# Patient Record
Sex: FEMALE | Race: WHITE | Employment: FULL TIME | URBAN - METROPOLITAN AREA
[De-identification: names, ages, dates, MRNs, and addresses within clinical notes are randomized per-mention and may not be internally consistent; named-entity substitution may affect disease eponyms.]

---

## 2017-03-16 ENCOUNTER — HOSPITAL ENCOUNTER (OUTPATIENT)
Age: 37
Discharge: HOME | End: 2017-03-16
Payer: COMMERCIAL

## 2017-03-16 LAB
THYROXINE, FREE: 1.18 NG/DL (ref 0.93–1.7)
TSH SERPL DL<=0.05 MIU/L-ACNC: 3.13 MIU/L (ref 0.3–5)

## 2017-03-16 PROCEDURE — 36415 COLL VENOUS BLD VENIPUNCTURE: CPT

## 2017-03-16 PROCEDURE — 84443 ASSAY THYROID STIM HORMONE: CPT

## 2017-03-16 PROCEDURE — 84439 ASSAY OF FREE THYROXINE: CPT

## 2017-10-16 ENCOUNTER — HOSPITAL ENCOUNTER (OUTPATIENT)
Age: 37
Discharge: HOME | End: 2017-10-16
Payer: COMMERCIAL

## 2017-10-16 LAB
THYROXINE, FREE: 1.26 NG/DL (ref 0.93–1.7)
TSH SERPL DL<=0.05 MIU/L-ACNC: 2.23 MIU/L (ref 0.3–5)

## 2017-10-16 PROCEDURE — 84443 ASSAY THYROID STIM HORMONE: CPT

## 2017-10-16 PROCEDURE — 36415 COLL VENOUS BLD VENIPUNCTURE: CPT

## 2017-10-16 PROCEDURE — 84439 ASSAY OF FREE THYROXINE: CPT

## 2018-07-30 ENCOUNTER — HOSPITAL ENCOUNTER (OUTPATIENT)
Age: 38
Discharge: HOME | End: 2018-07-30
Payer: COMMERCIAL

## 2018-07-30 LAB
THYROXINE, FREE: 1.27 NG/DL (ref 0.93–1.7)
TSH SERPL DL<=0.05 MIU/L-ACNC: 2.95 MIU/L (ref 0.3–5)

## 2018-07-30 PROCEDURE — 84439 ASSAY OF FREE THYROXINE: CPT

## 2018-07-30 PROCEDURE — 36415 COLL VENOUS BLD VENIPUNCTURE: CPT

## 2018-07-30 PROCEDURE — 84443 ASSAY THYROID STIM HORMONE: CPT

## 2018-09-18 ENCOUNTER — HOSPITAL ENCOUNTER (OUTPATIENT)
Age: 38
Discharge: HOME | End: 2018-09-18
Payer: COMMERCIAL

## 2018-09-18 ENCOUNTER — OFFICE VISIT (OUTPATIENT)
Dept: FAMILY MEDICINE CLINIC | Age: 38
End: 2018-09-18
Payer: COMMERCIAL

## 2018-09-18 VITALS
HEIGHT: 67 IN | DIASTOLIC BLOOD PRESSURE: 70 MMHG | SYSTOLIC BLOOD PRESSURE: 100 MMHG | BODY MASS INDEX: 21.41 KG/M2 | RESPIRATION RATE: 14 BRPM | WEIGHT: 136.4 LBS | TEMPERATURE: 97.9 F | HEART RATE: 56 BPM

## 2018-09-18 DIAGNOSIS — R53.83 FATIGUE, UNSPECIFIED TYPE: ICD-10-CM

## 2018-09-18 DIAGNOSIS — E03.9 HYPOTHYROIDISM, UNSPECIFIED TYPE: ICD-10-CM

## 2018-09-18 DIAGNOSIS — R00.1 BRADYCARDIA: ICD-10-CM

## 2018-09-18 DIAGNOSIS — R00.2 PALPITATION: Primary | ICD-10-CM

## 2018-09-18 DIAGNOSIS — R06.02 SOB (SHORTNESS OF BREATH): ICD-10-CM

## 2018-09-18 DIAGNOSIS — R00.2 PALPITATIONS: Primary | ICD-10-CM

## 2018-09-18 LAB
EKG ATRIAL RATE: 54 BPM
EKG P AXIS: 59 DEGREES
EKG P-R INTERVAL: 130 MS
EKG Q-T INTERVAL: 456 MS
EKG QRS DURATION: 82 MS
EKG QTC CALCULATION (BAZETT): 432 MS
EKG R AXIS: 61 DEGREES
EKG T AXIS: 25 DEGREES
EKG VENTRICULAR RATE: 54 BPM

## 2018-09-18 PROCEDURE — 93005 ELECTROCARDIOGRAM TRACING: CPT

## 2018-09-18 PROCEDURE — 99204 OFFICE O/P NEW MOD 45 MIN: CPT | Performed by: NURSE PRACTITIONER

## 2018-09-18 PROCEDURE — G8427 DOCREV CUR MEDS BY ELIG CLIN: HCPCS | Performed by: NURSE PRACTITIONER

## 2018-09-18 PROCEDURE — G8420 CALC BMI NORM PARAMETERS: HCPCS | Performed by: NURSE PRACTITIONER

## 2018-09-18 PROCEDURE — 1036F TOBACCO NON-USER: CPT | Performed by: NURSE PRACTITIONER

## 2018-09-18 RX ORDER — M-VIT,TX,IRON,MINS/CALC/FOLIC 27MG-0.4MG
1 TABLET ORAL DAILY
COMMUNITY

## 2018-09-18 RX ORDER — CHLORAL HYDRATE 500 MG
CAPSULE ORAL
COMMUNITY

## 2018-09-18 RX ORDER — LEVOTHYROXINE SODIUM 112 UG/1
TABLET ORAL
Refills: 1 | COMMUNITY
Start: 2018-08-19

## 2018-09-18 ASSESSMENT — ENCOUNTER SYMPTOMS
WHEEZING: 0
BACK PAIN: 0
ABDOMINAL PAIN: 0
NAUSEA: 0
SHORTNESS OF BREATH: 1

## 2018-09-18 ASSESSMENT — PATIENT HEALTH QUESTIONNAIRE - PHQ9
SUM OF ALL RESPONSES TO PHQ9 QUESTIONS 1 & 2: 0
SUM OF ALL RESPONSES TO PHQ QUESTIONS 1-9: 0
1. LITTLE INTEREST OR PLEASURE IN DOING THINGS: 0
2. FEELING DOWN, DEPRESSED OR HOPELESS: 0
SUM OF ALL RESPONSES TO PHQ QUESTIONS 1-9: 0

## 2018-09-18 NOTE — PROGRESS NOTES
Subjective:      Patient ID: Tucker Hardy is a 43 y.o. female. Visit Information    Have you changed or started any medications since your last visit including any over-the-counter medicines, vitamins, or herbal medicines? no   Are you having any side effects from any of your medications? -  no  Have you stopped taking any of your medications? Is so, why? -  no    Have you seen any other physician or provider since your last visit? Yes - Records Obtained  Have you had any other diagnostic tests since your last visit? Yes - Records Obtained  Have you been seen in the emergency room and/or had an admission to a hospital since we last saw you? No  Have you had your routine dental cleaning in the past 6 months? yes -     Have you activated your [employer] account? If not, what are your barriers? Yes     Patient Care Team:  Kelvin Santacruz MD as PCP - General (Family Medicine)  Evelyn Venegas MD as Consulting Physician (Endocrinology)  Shonda Woo MD    Medical History Review  Past Medical, Family, and Social History reviewed and does not contribute to the patient presenting condition    Health Maintenance   Topic Date Due    HIV screen  01/13/1994    DTaP/Tdap/Td vaccine (5 - Tdap) 07/23/1997    Cervical cancer screen  03/20/2018    Flu vaccine (1) 09/01/2018    TSH testing  07/30/2019     HPI\  43year old female presents with palpitations tachycardia for over a months and she noticed it more often. States her pulses drop to 40s when she has palpitations. She also has fatigue and mild sob at times. She says she can feel her heart beat in her throat. Denies dizziness lightheadedness chest pain diaphoresis headache or nv abd pain. Currently she is not on bp therapy and it is stable today. Hx of hypothyroidism managed by Dr. Mitch Reece. Her recent thyroid level was unremarkable. She has hx of mitral valve prolapse. Pt is non smoker alcohol or illicit drug use. fhx of heart disease.     Review of Systems

## 2018-09-19 ENCOUNTER — HOSPITAL ENCOUNTER (OUTPATIENT)
Age: 38
Discharge: HOME | End: 2018-09-19
Payer: COMMERCIAL

## 2018-09-19 DIAGNOSIS — R00.1 BRADYCARDIA: ICD-10-CM

## 2018-09-19 DIAGNOSIS — R00.2 PALPITATION: ICD-10-CM

## 2018-09-19 DIAGNOSIS — R53.83 FATIGUE, UNSPECIFIED TYPE: ICD-10-CM

## 2018-09-19 LAB
ANION GAP SERPL CALCULATED.3IONS-SCNC: 10 MMOL/L (ref 9–17)
BUN BLDV-MCNC: 12 MG/DL (ref 6–20)
BUN/CREAT BLD: NORMAL (ref 9–20)
CALCIUM SERPL-MCNC: 8.9 MG/DL (ref 8.6–10.4)
CHLORIDE BLD-SCNC: 104 MMOL/L (ref 98–107)
CHOLESTEROL/HDL RATIO: 1.8
CHOLESTEROL: 142 MG/DL
CO2: 27 MMOL/L (ref 20–31)
CREAT SERPL-MCNC: 0.86 MG/DL (ref 0.5–0.9)
GFR AFRICAN AMERICAN: >60 ML/MIN
GFR NON-AFRICAN AMERICAN: >60 ML/MIN
GFR SERPL CREATININE-BSD FRML MDRD: NORMAL ML/MIN/{1.73_M2}
GFR SERPL CREATININE-BSD FRML MDRD: NORMAL ML/MIN/{1.73_M2}
GLUCOSE BLD-MCNC: 95 MG/DL (ref 70–99)
HCT VFR BLD CALC: 38.6 % (ref 36–46)
HDLC SERPL-MCNC: 81 MG/DL
HEMOGLOBIN: 12.6 G/DL (ref 12–16)
LDL CHOLESTEROL: 50 MG/DL (ref 0–130)
MCH RBC QN AUTO: 29.1 PG (ref 26–34)
MCHC RBC AUTO-ENTMCNC: 32.8 G/DL (ref 31–37)
MCV RBC AUTO: 88.8 FL (ref 80–100)
NRBC AUTOMATED: NORMAL PER 100 WBC
PDW BLD-RTO: 13.3 % (ref 11.5–14.9)
PLATELET # BLD: 235 K/UL (ref 150–450)
PMV BLD AUTO: 9 FL (ref 6–12)
POTASSIUM SERPL-SCNC: 4.7 MMOL/L (ref 3.7–5.3)
RBC # BLD: 4.34 M/UL (ref 4–5.2)
SODIUM BLD-SCNC: 141 MMOL/L (ref 135–144)
TRIGL SERPL-MCNC: 55 MG/DL
TSH SERPL DL<=0.05 MIU/L-ACNC: 2.7 MIU/L (ref 0.3–5)
VLDLC SERPL CALC-MCNC: NORMAL MG/DL (ref 1–30)
WBC # BLD: 5.2 K/UL (ref 3.5–11)

## 2018-09-19 PROCEDURE — 80048 BASIC METABOLIC PNL TOTAL CA: CPT

## 2018-09-19 PROCEDURE — 80061 LIPID PANEL: CPT

## 2018-09-19 PROCEDURE — 84443 ASSAY THYROID STIM HORMONE: CPT

## 2018-09-19 PROCEDURE — 36415 COLL VENOUS BLD VENIPUNCTURE: CPT

## 2018-09-19 PROCEDURE — 85027 COMPLETE CBC AUTOMATED: CPT

## 2018-10-09 ENCOUNTER — HOSPITAL ENCOUNTER (OUTPATIENT)
Age: 38
Discharge: HOME | End: 2018-10-09
Payer: COMMERCIAL

## 2018-10-09 LAB
THYROXINE, FREE: 1.3 NG/DL (ref 0.93–1.7)
TSH SERPL DL<=0.05 MIU/L-ACNC: 3.01 MIU/L (ref 0.3–5)

## 2018-10-09 PROCEDURE — 84439 ASSAY OF FREE THYROXINE: CPT

## 2018-10-09 PROCEDURE — 84443 ASSAY THYROID STIM HORMONE: CPT

## 2018-10-09 PROCEDURE — 36415 COLL VENOUS BLD VENIPUNCTURE: CPT

## 2018-11-20 ENCOUNTER — HOSPITAL ENCOUNTER (OUTPATIENT)
Age: 38
Discharge: HOME | End: 2018-11-20
Payer: COMMERCIAL

## 2018-11-20 LAB
CALCIUM SERPL-MCNC: 8.7 MG/DL (ref 8.6–10.4)
MAGNESIUM: 2 MG/DL (ref 1.6–2.6)
POTASSIUM SERPL-SCNC: 4.7 MMOL/L (ref 3.7–5.3)

## 2018-11-20 PROCEDURE — 83735 ASSAY OF MAGNESIUM: CPT

## 2018-11-20 PROCEDURE — 36415 COLL VENOUS BLD VENIPUNCTURE: CPT

## 2018-11-20 PROCEDURE — 84132 ASSAY OF SERUM POTASSIUM: CPT

## 2018-11-20 PROCEDURE — 82310 ASSAY OF CALCIUM: CPT

## 2018-11-20 PROCEDURE — 83835 ASSAY OF METANEPHRINES: CPT

## 2018-11-21 ENCOUNTER — OFFICE VISIT (OUTPATIENT)
Dept: FAMILY MEDICINE CLINIC | Age: 38
End: 2018-11-21
Payer: COMMERCIAL

## 2018-11-21 VITALS
RESPIRATION RATE: 16 BRPM | HEART RATE: 58 BPM | WEIGHT: 136 LBS | SYSTOLIC BLOOD PRESSURE: 100 MMHG | DIASTOLIC BLOOD PRESSURE: 60 MMHG | BODY MASS INDEX: 21.3 KG/M2 | TEMPERATURE: 97.1 F

## 2018-11-21 DIAGNOSIS — E03.9 HYPOTHYROIDISM, UNSPECIFIED TYPE: Primary | ICD-10-CM

## 2018-11-21 DIAGNOSIS — R00.2 PALPITATION: ICD-10-CM

## 2018-11-21 DIAGNOSIS — R00.1 BRADYCARDIA: ICD-10-CM

## 2018-11-21 PROCEDURE — G8420 CALC BMI NORM PARAMETERS: HCPCS | Performed by: NURSE PRACTITIONER

## 2018-11-21 PROCEDURE — G8427 DOCREV CUR MEDS BY ELIG CLIN: HCPCS | Performed by: NURSE PRACTITIONER

## 2018-11-21 PROCEDURE — 99214 OFFICE O/P EST MOD 30 MIN: CPT | Performed by: NURSE PRACTITIONER

## 2018-11-21 PROCEDURE — 1036F TOBACCO NON-USER: CPT | Performed by: NURSE PRACTITIONER

## 2018-11-21 PROCEDURE — G8484 FLU IMMUNIZE NO ADMIN: HCPCS | Performed by: NURSE PRACTITIONER

## 2018-11-21 ASSESSMENT — ENCOUNTER SYMPTOMS
ABDOMINAL PAIN: 0
WHEEZING: 0
NAUSEA: 0
SHORTNESS OF BREATH: 0

## 2018-11-21 NOTE — PROGRESS NOTES
Subjective:      Patient ID: George Hope is a 43 y.o. female. Visit Information    Have you changed or started any medications since your last visit including any over-the-counter medicines, vitamins, or herbal medicines? no   Are you having any side effects from any of your medications? -  no  Have you stopped taking any of your medications? Is so, why? -  no    Have you seen any other physician or provider since your last visit? Yes - Records Obtained  Have you had any other diagnostic tests since your last visit? Yes - Records Obtained  Have you been seen in the emergency room and/or had an admission to a hospital since we last saw you? No  Have you had your routine dental cleaning in the past 6 months? yes -     Have you activated your [employer] account? If not, what are your barriers? Yes     Patient Care Team:  Justyn Mcdowell MD as PCP - General (Family Medicine)  Bindu Meyer MD as Consulting Physician (Endocrinology)  Colton Huston MD    Medical History Review  Past Medical, Family, and Social History reviewed and does not contribute to the patient presenting condition    Health Maintenance   Topic Date Due    HIV screen  01/13/1994    DTaP/Tdap/Td vaccine (5 - Tdap) 07/23/1997    Cervical cancer screen  03/20/2018    Flu vaccine (1) 09/01/2018    TSH testing  10/09/2019       HPI     43year old female presents with management of hypothyroidism palpitations bradycardia. Currently is on levothyroxine and recent blood tests showed normal TSH and T4. She also follows up with cardiology and cardiac workup was unremarkable. States she is doing better and had one episode of palpitation over the past month. States she runs 20 miles per week and denies dizziness sob chest pain. Pt is non smoker alcohol or illicit drug use. Review of Systems   Constitutional: Negative for chills and fever. Eyes: Negative for visual disturbance. Respiratory: Negative for shortness of breath and wheezing. Cardiovascular: Negative for chest pain and leg swelling. Gastrointestinal: Negative for abdominal pain and nausea. Neurological: Negative for dizziness, weakness and numbness. Psychiatric/Behavioral: Negative for dysphoric mood. Objective:   Physical Exam   Constitutional: She appears well-nourished. No distress. HENT:   Nose: Nose normal.   Mouth/Throat: Oropharynx is clear and moist.   Eyes: Conjunctivae are normal.   Neck: Neck supple. Cardiovascular: Normal rate, regular rhythm and normal heart sounds. Pulmonary/Chest: Effort normal and breath sounds normal. No respiratory distress. Abdominal: Soft. There is no tenderness. Musculoskeletal: Normal range of motion. Lymphadenopathy:     She has no cervical adenopathy. Neurological: She is alert. No cranial nerve deficit. Skin: Skin is warm and dry. Psychiatric: She has a normal mood and affect. Her behavior is normal.   Nursing note and vitals reviewed. Assessment:      1. Hypothyroidism, unspecified type    2. Bradycardia    3. Palpitation            Plan:      BP Readings from Last 3 Encounters:   11/21/18 100/60   09/18/18 100/70   09/08/14 98/60     /60 (Site: Left Upper Arm, Position: Sitting, Cuff Size: Medium Adult)   Pulse 58   Temp 97.1 °F (36.2 °C) (Oral)   Resp 16   Wt 136 lb (61.7 kg)   BMI 21.30 kg/m²   Lab Results   Component Value Date    WBC 5.2 09/19/2018    HGB 12.6 09/19/2018    HCT 38.6 09/19/2018     09/19/2018    CHOL 142 09/19/2018    TRIG 55 09/19/2018    HDL 81 09/19/2018     09/19/2018    K 4.7 11/20/2018     09/19/2018    CREATININE 0.86 09/19/2018    BUN 12 09/19/2018    CO2 27 09/19/2018    TSH 3.01 10/09/2018     Lab Results   Component Value Date    CALCIUM 8.7 11/20/2018     Lab Results   Component Value Date    LDLCHOLESTEROL 50 09/19/2018       1. Hypothyroidism, unspecified type  -stable. Cont current dose. 2. Bradycardia/ 3. Palpitation  - stable.  Cont to monitor.   - follow up with cardiology as scheduled. Requested Prescriptions      No prescriptions requested or ordered in this encounter       There are no discontinued medications. Munira received counseling on the following healthy behaviors: nutrition and exercise  Reviewed prior labs and health maintenance. Continue current medications, diet and exercise. Discussed use, benefit, and side effects of prescribed medications. Barriers to medication compliance addressed. Patient given educational materials - see patient instructions. All patient questions answered. Patient voiced understanding.

## 2018-11-25 LAB
METANEPH/PLASMA INTERP: NORMAL
METANEPHRINE: 0.21 NMOL/L (ref 0–0.49)
NORMETANEPHRINE PLASMA: 0.4 NMOL/L (ref 0–0.89)

## 2019-04-19 ENCOUNTER — HOSPITAL ENCOUNTER (OUTPATIENT)
Age: 39
Discharge: HOME | End: 2019-04-19
Payer: COMMERCIAL

## 2019-04-19 LAB
THYROXINE, FREE: 1.33 NG/DL (ref 0.93–1.7)
TSH SERPL DL<=0.05 MIU/L-ACNC: 1.85 MIU/L (ref 0.3–5)

## 2019-04-19 PROCEDURE — 84439 ASSAY OF FREE THYROXINE: CPT

## 2019-04-19 PROCEDURE — 84443 ASSAY THYROID STIM HORMONE: CPT

## 2019-04-19 PROCEDURE — 36415 COLL VENOUS BLD VENIPUNCTURE: CPT

## 2019-05-29 ENCOUNTER — OFFICE VISIT (OUTPATIENT)
Dept: FAMILY MEDICINE CLINIC | Age: 39
End: 2019-05-29
Payer: COMMERCIAL

## 2019-05-29 VITALS
TEMPERATURE: 97.8 F | WEIGHT: 137 LBS | HEART RATE: 60 BPM | BODY MASS INDEX: 21.46 KG/M2 | SYSTOLIC BLOOD PRESSURE: 100 MMHG | DIASTOLIC BLOOD PRESSURE: 68 MMHG | RESPIRATION RATE: 16 BRPM

## 2019-05-29 DIAGNOSIS — E03.9 HYPOTHYROIDISM, UNSPECIFIED TYPE: Primary | ICD-10-CM

## 2019-05-29 DIAGNOSIS — R00.1 BRADYCARDIA: ICD-10-CM

## 2019-05-29 DIAGNOSIS — R00.2 PALPITATIONS: ICD-10-CM

## 2019-05-29 PROCEDURE — 99214 OFFICE O/P EST MOD 30 MIN: CPT | Performed by: NURSE PRACTITIONER

## 2019-05-29 PROCEDURE — G8420 CALC BMI NORM PARAMETERS: HCPCS | Performed by: NURSE PRACTITIONER

## 2019-05-29 PROCEDURE — G8427 DOCREV CUR MEDS BY ELIG CLIN: HCPCS | Performed by: NURSE PRACTITIONER

## 2019-05-29 PROCEDURE — 1036F TOBACCO NON-USER: CPT | Performed by: NURSE PRACTITIONER

## 2019-05-29 ASSESSMENT — PATIENT HEALTH QUESTIONNAIRE - PHQ9
2. FEELING DOWN, DEPRESSED OR HOPELESS: 0
1. LITTLE INTEREST OR PLEASURE IN DOING THINGS: 0
SUM OF ALL RESPONSES TO PHQ9 QUESTIONS 1 & 2: 0
SUM OF ALL RESPONSES TO PHQ QUESTIONS 1-9: 0
SUM OF ALL RESPONSES TO PHQ QUESTIONS 1-9: 0

## 2019-05-29 ASSESSMENT — ENCOUNTER SYMPTOMS
WHEEZING: 0
NAUSEA: 0
VOMITING: 0
ABDOMINAL PAIN: 0
SHORTNESS OF BREATH: 0

## 2019-05-29 NOTE — PROGRESS NOTES
Subjective:      Patient ID: Corinn Severs is a 35 y.o. female. Chronic Disease Visit Information    BP Readings from Last 3 Encounters:   05/29/19 100/68   11/21/18 100/60   09/18/18 100/70          LDL Cholesterol (mg/dL)   Date Value   09/19/2018 50     HDL (mg/dL)   Date Value   09/19/2018 81     BUN (mg/dL)   Date Value   09/19/2018 12     CREATININE (mg/dL)   Date Value   09/19/2018 0.86     Glucose (mg/dL)   Date Value   09/19/2018 95            Have you changed or started any medications since your last visit including any over-the-counter medicines, vitamins, or herbal medicines? no   Are you having any side effects from any of your medications? -  no  Have you stopped taking any of your medications? Is so, why? -  no    Have you seen any other physician or provider since your last visit? Yes - Records Obtained  Have you had any other diagnostic tests since your last visit? Yes - Records Obtained  Have you been seen in the emergency room and/or had an admission to a hospital since we last saw you? No  Have you had your annual diabetic retinal (eye) exam? No  Have you had your routine dental cleaning in the past 6 months? yes -     Have you activated your [employer] account? If not, what are your barriers? Yes     Patient Care Team:  Julia Meyers MD as PCP - General (Family Medicine)  Angelic Wylie MD as Consulting Physician (Endocrinology)  Netta Rojas MD         Medical History Review  Past Medical, Family, and Social History reviewed and does contribute to the patient presenting condition    Health Maintenance   Topic Date Due    HIV screen  01/13/1994    DTaP/Tdap/Td vaccine (6 - Tdap) 07/22/2007    Flu vaccine (Season Ended) 09/01/2019    Cervical cancer screen  03/20/2020    TSH testing  04/19/2020    Lipid screen  09/19/2023    Pneumococcal 0-64 years Vaccine  Aged Out     HPI  35year old female presents with management of hypothyroidism, palpitations and bradycardia. Currently is on levothyroxine and recent blood tests showed normal TSH and FT4. Denies any appetite or weight changes. Has been doing well over the past 6 months. No recurrent palpitations or low heart rate. HR is 60s today. She also follows up with cardiologist. States she runs 3 miles every day and tolerates running, denies sob chest discomfort dizziness or nv abd pain. Pt is non smoker. Review of Systems   Constitutional: Negative for chills, fatigue, fever and unexpected weight change. Respiratory: Negative for shortness of breath and wheezing. Cardiovascular: Negative for chest pain, palpitations and leg swelling. Gastrointestinal: Negative for abdominal pain, nausea and vomiting. Neurological: Negative for dizziness, weakness and numbness. Psychiatric/Behavioral: Negative for agitation, behavioral problems and dysphoric mood. Objective:   Physical Exam   Constitutional: She appears well-nourished. No distress. HENT:   Nose: Nose normal.   Mouth/Throat: Oropharynx is clear and moist.   Eyes: Conjunctivae are normal.   Neck: Neck supple. Cardiovascular: Normal rate, regular rhythm and normal heart sounds. Pulmonary/Chest: Effort normal and breath sounds normal. No respiratory distress. Abdominal: Soft. There is no tenderness. Musculoskeletal: Normal range of motion. Lymphadenopathy:     She has no cervical adenopathy. Neurological: She is alert. No cranial nerve deficit. Skin: Skin is warm and dry. Psychiatric: She has a normal mood and affect. Her behavior is normal.   Nursing note and vitals reviewed. Assessment:      1. Hypothyroidism, unspecified type    2. Bradycardia    3.  Palpitations            Plan:       BP Readings from Last 3 Encounters:   05/29/19 100/68   11/21/18 100/60   09/18/18 100/70     /68 (Site: Left Upper Arm, Position: Sitting, Cuff Size: Medium Adult)   Pulse 60   Temp 97.8 °F (36.6 °C) (Oral)   Resp 16   Wt 137 lb (62.1 kg)   BMI 21.46 kg/m²   Lab Results   Component Value Date    WBC 5.2 09/19/2018    HGB 12.6 09/19/2018    HCT 38.6 09/19/2018     09/19/2018    CHOL 142 09/19/2018    TRIG 55 09/19/2018    HDL 81 09/19/2018     09/19/2018    K 4.7 11/20/2018     09/19/2018    CREATININE 0.86 09/19/2018    BUN 12 09/19/2018    CO2 27 09/19/2018    TSH 1.85 04/19/2019     Lab Results   Component Value Date    CALCIUM 8.7 11/20/2018     Lab Results   Component Value Date    LDLCHOLESTEROL 50 09/19/2018         1. Hypothyroidism, unspecified type  - stable. Cont current dose     2. Bradycardia/ 3. Palpitations  - stable. Cont to monitor   - follow up with cardiologist as scheduled      Requested Prescriptions      No prescriptions requested or ordered in this encounter       There are no discontinued medications. Munira received counseling on the following healthy behaviors: nutrition and exercise  Reviewed prior labs and health maintenance  Continue current medications, diet and exercise. Discussed use, benefit, and side effects of prescribed medications. Barriers to medication compliance addressed. Patient given educational materials - see patient instructions  Was a self-tracking handout given in paper form or via [employer]? Yes    Requested Prescriptions      No prescriptions requested or ordered in this encounter       All patient questions answered. Patient voiced understanding. Quality Measures    Body mass index is 21.46 kg/m². Normal. Weight control planned discussed medically supervised diet with primary care physician and Healthy diet and regular exercise. BP: 100/68 Blood pressure is normal. Treatment plan consists of Weight Reduction, Dietary Sodium Restriction, Increased Physical Activity and No treatment change needed.     Lab Results   Component Value Date    LDLCHOLESTEROL 50 09/19/2018    (goal LDL reduction with dx if diabetes is 50% LDL reduction)      PHQ Scores 5/29/2019 9/18/2018   PHQ2

## 2019-09-30 ENCOUNTER — OFFICE VISIT (OUTPATIENT)
Dept: FAMILY MEDICINE CLINIC | Age: 39
End: 2019-09-30
Payer: COMMERCIAL

## 2019-09-30 VITALS
RESPIRATION RATE: 16 BRPM | TEMPERATURE: 97.2 F | BODY MASS INDEX: 21.77 KG/M2 | DIASTOLIC BLOOD PRESSURE: 62 MMHG | HEART RATE: 64 BPM | SYSTOLIC BLOOD PRESSURE: 100 MMHG | WEIGHT: 139 LBS

## 2019-09-30 DIAGNOSIS — R00.2 PALPITATIONS: Primary | ICD-10-CM

## 2019-09-30 DIAGNOSIS — E03.9 HYPOTHYROIDISM, UNSPECIFIED TYPE: ICD-10-CM

## 2019-09-30 PROCEDURE — 99214 OFFICE O/P EST MOD 30 MIN: CPT | Performed by: NURSE PRACTITIONER

## 2019-09-30 ASSESSMENT — ENCOUNTER SYMPTOMS
NAUSEA: 0
VOMITING: 0
ABDOMINAL PAIN: 0
WHEEZING: 0
SHORTNESS OF BREATH: 0

## 2019-09-30 NOTE — PROGRESS NOTES
Subjective:      Patient ID: Judson Fairbanks is a 35 y.o. female. Visit Information    Have you changed or started any medications since your last visit including any over-the-counter medicines, vitamins, or herbal medicines? no   Are you having any side effects from any of your medications? -  no  Have you stopped taking any of your medications? Is so, why? -  no    Have you seen any other physician or provider since your last visit? No  Have you had any other diagnostic tests since your last visit? No  Have you been seen in the emergency room and/or had an admission to a hospital since we last saw you? No  Have you had your routine dental cleaning in the past 6 months? yes -     Have you activated your [employer] account? If not, what are your barriers? Yes     Patient Care Team:  Chente Ortega MD as PCP - General (Family Medicine)  Laura Morejon MD as Consulting Physician (Endocrinology)  Rico Craig MD    Medical History Review  Past Medical, Family, and Social History reviewed and does not contribute to the patient presenting condition    Health Maintenance   Topic Date Due    HIV screen  01/13/1994    DTaP/Tdap/Td vaccine (6 - Tdap) 07/22/2007    Flu vaccine (1) 09/01/2019    Cervical cancer screen  03/20/2020    TSH testing  04/19/2020    Lipid screen  09/19/2023    Pneumococcal 0-64 years Vaccine  Aged Out     HPI     35year old female presents with management of palpitations and hypothyroidism. States she still has intermittent palpitations over the past 2 months but for a brief time. The symptoms happens randomly. States she exercises daily but denies sob cp dizziness. Had holter monitor by Dr. Cal Rasheed which was unremarkable. States she has significant fhx of heart disease. her brother was diagnosed with a fib recent. Currently is on levothyroxine and recent blood tests showed normal TSH and FT4. Pt is non smoker. Review of Systems   Constitutional: Negative for chills and fever. Respiratory: Negative for shortness of breath and wheezing. Cardiovascular: Positive for palpitations. Negative for chest pain and leg swelling. Gastrointestinal: Negative for abdominal pain, nausea and vomiting. Neurological: Negative for dizziness, weakness and numbness. Psychiatric/Behavioral: Negative for agitation and behavioral problems. Objective:   Physical Exam   Constitutional: She appears well-nourished. No distress. HENT:   Nose: Nose normal.   Mouth/Throat: Oropharynx is clear and moist.   Eyes: Conjunctivae are normal.   Neck: Neck supple. Cardiovascular: Normal rate, regular rhythm and normal heart sounds. Pulmonary/Chest: Effort normal and breath sounds normal. No respiratory distress. Abdominal: Soft. There is no tenderness. Musculoskeletal: Normal range of motion. Lymphadenopathy:     She has no cervical adenopathy. Neurological: She is alert. No cranial nerve deficit. Skin: Skin is warm and dry. No rash noted. Psychiatric: She has a normal mood and affect. Her behavior is normal.   Nursing note and vitals reviewed. Assessment:      1. Palpitations    2. Hypothyroidism, unspecified type            Plan:      BP Readings from Last 3 Encounters:   09/30/19 100/62   05/29/19 100/68   11/21/18 100/60     /62 (Site: Right Upper Arm, Position: Sitting, Cuff Size: Medium Adult)   Pulse 64   Temp 97.2 °F (36.2 °C) (Oral)   Resp 16   Wt 139 lb (63 kg)   BMI 21.77 kg/m²   Lab Results   Component Value Date    WBC 5.2 09/19/2018    HGB 12.6 09/19/2018    HCT 38.6 09/19/2018     09/19/2018    CHOL 142 09/19/2018    TRIG 55 09/19/2018    HDL 81 09/19/2018     09/19/2018    K 4.7 11/20/2018     09/19/2018    CREATININE 0.86 09/19/2018    BUN 12 09/19/2018    CO2 27 09/19/2018    TSH 1.85 04/19/2019     Lab Results   Component Value Date    CALCIUM 8.7 11/20/2018     Lab Results   Component Value Date    LDLCHOLESTEROL 50 09/19/2018         1. Palpitations  - EKG 12 lead; Future  - follow up with Dr. Chalo Morales as scheduled. 2. Hypothyroidism, unspecified type  - stable. Cont current dose. - follow up with Dr. Annabel Lazar as scheduled     Requested Prescriptions      No prescriptions requested or ordered in this encounter       There are no discontinued medications. Discussed use, benefit, and side effects of prescribed medications. Barriers to medication compliance addressed. All patient questions answered. Pt voiced understanding. Return for palpitions , hypothyroidism.

## 2020-04-17 ENCOUNTER — TELEPHONE (OUTPATIENT)
Dept: FAMILY MEDICINE CLINIC | Age: 40
End: 2020-04-17

## 2020-05-05 ENCOUNTER — HOSPITAL ENCOUNTER (OUTPATIENT)
Age: 40
Discharge: HOME | End: 2020-05-05
Payer: COMMERCIAL

## 2020-05-05 LAB
THYROXINE, FREE: 1.33 NG/DL (ref 0.93–1.7)
TSH SERPL DL<=0.05 MIU/L-ACNC: 1.1 MIU/L (ref 0.3–5)

## 2020-05-05 PROCEDURE — 36415 COLL VENOUS BLD VENIPUNCTURE: CPT

## 2020-05-05 PROCEDURE — 84443 ASSAY THYROID STIM HORMONE: CPT

## 2020-05-05 PROCEDURE — 84439 ASSAY OF FREE THYROXINE: CPT

## 2020-05-12 PROBLEM — R53.82 CHRONIC FATIGUE: Status: ACTIVE | Noted: 2020-05-12

## 2020-05-13 ENCOUNTER — HOSPITAL ENCOUNTER (OUTPATIENT)
Age: 40
Discharge: HOME | End: 2020-05-13
Payer: COMMERCIAL

## 2020-05-13 LAB
ALBUMIN SERPL-MCNC: 4.6 G/DL (ref 3.5–5.2)
ALBUMIN/GLOBULIN RATIO: ABNORMAL (ref 1–2.5)
ALP BLD-CCNC: 54 U/L (ref 35–104)
ALT SERPL-CCNC: 13 U/L (ref 5–33)
ANION GAP SERPL CALCULATED.3IONS-SCNC: 10 MMOL/L (ref 9–17)
AST SERPL-CCNC: 18 U/L
BILIRUB SERPL-MCNC: 0.32 MG/DL (ref 0.3–1.2)
BUN BLDV-MCNC: 20 MG/DL (ref 6–20)
BUN/CREAT BLD: ABNORMAL (ref 9–20)
CALCIUM SERPL-MCNC: 8.9 MG/DL (ref 8.6–10.4)
CHLORIDE BLD-SCNC: 106 MMOL/L (ref 98–107)
CO2: 23 MMOL/L (ref 20–31)
CREAT SERPL-MCNC: 0.83 MG/DL (ref 0.5–0.9)
GFR AFRICAN AMERICAN: >60 ML/MIN
GFR NON-AFRICAN AMERICAN: >60 ML/MIN
GFR SERPL CREATININE-BSD FRML MDRD: ABNORMAL ML/MIN/{1.73_M2}
GFR SERPL CREATININE-BSD FRML MDRD: ABNORMAL ML/MIN/{1.73_M2}
GLUCOSE BLD-MCNC: 108 MG/DL (ref 70–99)
IRON SATURATION: 17 % (ref 20–55)
IRON: 54 UG/DL (ref 37–145)
POTASSIUM SERPL-SCNC: 4.2 MMOL/L (ref 3.7–5.3)
SODIUM BLD-SCNC: 139 MMOL/L (ref 135–144)
TOTAL IRON BINDING CAPACITY: 312 UG/DL (ref 250–450)
TOTAL PROTEIN: 7.2 G/DL (ref 6.4–8.3)
UNSATURATED IRON BINDING CAPACITY: 258 UG/DL (ref 112–347)
VITAMIN D 25-HYDROXY: 29 NG/ML (ref 30–100)

## 2020-05-13 PROCEDURE — 83540 ASSAY OF IRON: CPT

## 2020-05-13 PROCEDURE — 36415 COLL VENOUS BLD VENIPUNCTURE: CPT

## 2020-05-13 PROCEDURE — 80053 COMPREHEN METABOLIC PANEL: CPT

## 2020-05-13 PROCEDURE — 83550 IRON BINDING TEST: CPT

## 2020-05-13 PROCEDURE — 82306 VITAMIN D 25 HYDROXY: CPT

## 2020-05-14 ENCOUNTER — HOSPITAL ENCOUNTER (OUTPATIENT)
Age: 40
Discharge: HOME | End: 2020-05-14
Payer: COMMERCIAL

## 2020-05-14 LAB
AMOUNT GLUCOSE GIVEN: 75 G
GLUCOSE FASTING: 110 MG/DL (ref 65–99)
GLUCOSE TOLERANCE TEST 1 HOUR: 128 MG/DL (ref 65–184)
GLUCOSE TOLERANCE TEST 2 HOUR: 92 MG/DL (ref 65–139)
GLUCOSE TOLERANCE TEST 3 HOUR: 84 MG/DL (ref 65–130)
GLUCOSE, 4 HOUR: 91 MG/DL (ref 65–125)
GLUCOSE, 5 HR: 122 MG/DL (ref 65–109)

## 2020-05-14 PROCEDURE — 82951 GLUCOSE TOLERANCE TEST (GTT): CPT

## 2020-05-14 PROCEDURE — 82952 GTT-ADDED SAMPLES: CPT

## 2020-05-14 PROCEDURE — 36415 COLL VENOUS BLD VENIPUNCTURE: CPT

## 2020-05-15 ENCOUNTER — HOSPITAL ENCOUNTER (OUTPATIENT)
Dept: INPATIENT UNIT | Age: 40
Setting detail: THERAPIES SERIES
Discharge: HOME | End: 2020-05-15
Payer: COMMERCIAL

## 2020-05-15 VITALS
TEMPERATURE: 97.8 F | SYSTOLIC BLOOD PRESSURE: 97 MMHG | RESPIRATION RATE: 18 BRPM | DIASTOLIC BLOOD PRESSURE: 68 MMHG | OXYGEN SATURATION: 96 % | HEART RATE: 50 BPM

## 2020-05-15 LAB
CORTISOL COLLECTION INFO: NORMAL
CORTISOL: 16.6 UG/DL (ref 2.7–18.4)

## 2020-05-15 PROCEDURE — 2580000003 HC RX 258: Performed by: INTERNAL MEDICINE

## 2020-05-15 PROCEDURE — 82533 TOTAL CORTISOL: CPT

## 2020-05-15 PROCEDURE — 36415 COLL VENOUS BLD VENIPUNCTURE: CPT

## 2020-05-15 PROCEDURE — 6360000002 HC RX W HCPCS: Performed by: INTERNAL MEDICINE

## 2020-05-15 PROCEDURE — 96374 THER/PROPH/DIAG INJ IV PUSH: CPT

## 2020-05-15 RX ADMIN — COSYNTROPIN 1 MCG: 0.25 INJECTION, POWDER, LYOPHILIZED, FOR SOLUTION INTRAMUSCULAR; INTRAVENOUS at 08:35

## 2020-05-28 ENCOUNTER — TELEPHONE (OUTPATIENT)
Dept: FAMILY MEDICINE CLINIC | Age: 40
End: 2020-05-28

## 2020-05-28 RX ORDER — VALACYCLOVIR HYDROCHLORIDE 1 G/1
2000 TABLET, FILM COATED ORAL 2 TIMES DAILY
Qty: 4 TABLET | Refills: 0 | Status: SHIPPED | OUTPATIENT
Start: 2020-05-28

## 2020-05-28 NOTE — TELEPHONE ENCOUNTER
Pt called stating she has had a cold sore on her lip for about a week. Pt has been using abriva but hasn't been helping and wants to know if we can call something in. Please advise.

## 2020-07-21 ENCOUNTER — HOSPITAL ENCOUNTER (OUTPATIENT)
Age: 40
Discharge: HOME | End: 2020-07-21
Payer: COMMERCIAL

## 2020-07-21 LAB
IRON SATURATION: 18 % (ref 20–55)
IRON: 51 UG/DL (ref 37–145)
TOTAL IRON BINDING CAPACITY: 284 UG/DL (ref 250–450)
UNSATURATED IRON BINDING CAPACITY: 233 UG/DL (ref 112–347)
VITAMIN D 25-HYDROXY: 45.2 NG/ML (ref 30–100)

## 2020-07-21 PROCEDURE — 36415 COLL VENOUS BLD VENIPUNCTURE: CPT

## 2020-07-21 PROCEDURE — 83540 ASSAY OF IRON: CPT

## 2020-07-21 PROCEDURE — 83550 IRON BINDING TEST: CPT

## 2020-07-21 PROCEDURE — 82306 VITAMIN D 25 HYDROXY: CPT

## 2021-01-29 ENCOUNTER — OFFICE VISIT (OUTPATIENT)
Dept: FAMILY MEDICINE CLINIC | Age: 41
End: 2021-01-29
Payer: COMMERCIAL

## 2021-01-29 VITALS
HEIGHT: 67 IN | WEIGHT: 131 LBS | BODY MASS INDEX: 20.56 KG/M2 | SYSTOLIC BLOOD PRESSURE: 96 MMHG | TEMPERATURE: 97.3 F | HEART RATE: 53 BPM | DIASTOLIC BLOOD PRESSURE: 62 MMHG

## 2021-01-29 DIAGNOSIS — H10.32 ACUTE BACTERIAL CONJUNCTIVITIS OF LEFT EYE: Primary | ICD-10-CM

## 2021-01-29 PROCEDURE — 99212 OFFICE O/P EST SF 10 MIN: CPT | Performed by: NURSE PRACTITIONER

## 2021-01-29 RX ORDER — MOXIFLOXACIN 5 MG/ML
1 SOLUTION/ DROPS OPHTHALMIC 3 TIMES DAILY
Qty: 3 ML | Refills: 0 | Status: SHIPPED | OUTPATIENT
Start: 2021-01-29 | End: 2021-02-05

## 2021-01-29 SDOH — ECONOMIC STABILITY: FOOD INSECURITY: WITHIN THE PAST 12 MONTHS, THE FOOD YOU BOUGHT JUST DIDN'T LAST AND YOU DIDN'T HAVE MONEY TO GET MORE.: NEVER TRUE

## 2021-01-29 SDOH — ECONOMIC STABILITY: FOOD INSECURITY: WITHIN THE PAST 12 MONTHS, YOU WORRIED THAT YOUR FOOD WOULD RUN OUT BEFORE YOU GOT MONEY TO BUY MORE.: NEVER TRUE

## 2021-01-29 SDOH — ECONOMIC STABILITY: TRANSPORTATION INSECURITY
IN THE PAST 12 MONTHS, HAS LACK OF TRANSPORTATION KEPT YOU FROM MEETINGS, WORK, OR FROM GETTING THINGS NEEDED FOR DAILY LIVING?: NO

## 2021-01-29 ASSESSMENT — PATIENT HEALTH QUESTIONNAIRE - PHQ9
2. FEELING DOWN, DEPRESSED OR HOPELESS: 0
SUM OF ALL RESPONSES TO PHQ QUESTIONS 1-9: 0

## 2021-01-29 ASSESSMENT — ENCOUNTER SYMPTOMS
EYE REDNESS: 1
EYE DISCHARGE: 1
EYE ITCHING: 1

## 2021-01-29 NOTE — PROGRESS NOTES
Subjective:      Patient ID: Jone Win is a 42 y.o. female. Conjunctivitis   The current episode started 2 days ago. The onset was sudden. The problem has been gradually worsening. The problem is mild. Associated symptoms include eye itching, congestion, eye discharge and eye redness. Pertinent negatives include no URI. The eye pain is mild. The left eye is affected. There is nasal congestion. The congestion does not interfere with sleep. Review of Systems   HENT: Positive for congestion. Eyes: Positive for discharge, redness and itching. Objective:   Physical Exam  Vitals signs and nursing note reviewed. Constitutional:       General: She is not in acute distress. Appearance: Normal appearance. She is well-developed. HENT:      Head: Normocephalic. Eyes:      General: No scleral icterus. Conjunctiva/sclera:      Left eye: Left eye conjunctiva injected:  mildly. Pupils: Pupils are equal, round, and reactive to light. Neck:      Thyroid: No thyromegaly. Cardiovascular:      Rate and Rhythm: Normal rate and regular rhythm. Heart sounds: Normal heart sounds. Pulmonary:      Effort: Pulmonary effort is normal. No respiratory distress. Breath sounds: Normal breath sounds. Assessment:      1.  Acute bacterial conjunctivitis of left eye            Plan:      BP Readings from Last 3 Encounters:   01/29/21 96/62   05/15/20 97/68   09/30/19 100/62     BP 96/62   Pulse 53   Temp 97.3 °F (36.3 °C)   Ht 5' 7\" (1.702 m)   Wt 131 lb (59.4 kg)   LMP 01/24/2021 (Exact Date)   BMI 20.52 kg/m²   Lab Results   Component Value Date    WBC 5.2 09/19/2018    HGB 12.6 09/19/2018    HCT 38.6 09/19/2018     09/19/2018    CHOL 142 09/19/2018    TRIG 55 09/19/2018    HDL 81 09/19/2018    ALT 13 05/13/2020    AST 18 05/13/2020     05/13/2020    K 4.2 05/13/2020     05/13/2020    CREATININE 0.83 05/13/2020    BUN 20 05/13/2020    CO2 23 05/13/2020    TSH 1.10 05/05/2020    GLUF 110 (H) 05/14/2020     Lab Results   Component Value Date    CALCIUM 8.9 05/13/2020     Lab Results   Component Value Date    LDLCHOLESTEROL 50 09/19/2018         1. Acute bacterial conjunctivitis of left eye  - moxifloxacin (VIGAMOX) 0.5 % ophthalmic solution; Place 1 drop into both eyes 3 times daily for 7 days  Dispense: 3 mL; Refill: 0  - Keep good hand hygiene and avoid rubbing eyes. - Use  warm moist compress for discomfort. Requested Prescriptions     Signed Prescriptions Disp Refills    moxifloxacin (VIGAMOX) 0.5 % ophthalmic solution 3 mL 0     Sig: Place 1 drop into both eyes 3 times daily for 7 days       There are no discontinued medications. Discussed use, benefit, and side effects of prescribed medications. Barriers to medication compliance addressed. All patient questions answered. Pt voiced understanding. Return if symptoms worsen or fail to improve.             Hadley Vivas, APRN - CNP

## 2021-03-22 ENCOUNTER — HOSPITAL ENCOUNTER (OUTPATIENT)
Age: 41
Discharge: HOME | End: 2021-03-22
Payer: COMMERCIAL

## 2021-03-22 LAB
THYROXINE, FREE: 1.39 NG/DL (ref 0.93–1.7)
TSH SERPL DL<=0.05 MIU/L-ACNC: 1.71 MIU/L (ref 0.3–5)

## 2021-03-22 PROCEDURE — 36415 COLL VENOUS BLD VENIPUNCTURE: CPT

## 2021-03-22 PROCEDURE — 84439 ASSAY OF FREE THYROXINE: CPT

## 2021-03-22 PROCEDURE — 84443 ASSAY THYROID STIM HORMONE: CPT

## 2021-09-22 ENCOUNTER — TELEPHONE (OUTPATIENT)
Dept: FAMILY MEDICINE CLINIC | Age: 41
End: 2021-09-22

## 2021-09-22 RX ORDER — TRIAMCINOLONE ACETONIDE 1 MG/G
CREAM TOPICAL
Qty: 30 G | Refills: 0 | Status: SHIPPED | OUTPATIENT
Start: 2021-09-22

## 2021-09-22 RX ORDER — PREDNISONE 10 MG/1
TABLET ORAL
Qty: 12 TABLET | Refills: 0 | Status: SHIPPED | OUTPATIENT
Start: 2021-09-22 | End: 2022-04-28 | Stop reason: ALTCHOICE

## 2021-09-22 NOTE — TELEPHONE ENCOUNTER
I sent prednisone and topical cream and have pt take some benadryl for itching.    Have pt fu if rash is not improving

## 2021-09-22 NOTE — TELEPHONE ENCOUNTER
----- Message from Stephenhood Darius sent at 9/22/2021  9:26 AM EDT -----  Subject: Appointment Request    Reason for Call: Semi-Routine Skin Problem    QUESTIONS  Type of Appointment? Established Patient  Reason for appointment request? Available appointments did not meet   patient need  Additional Information for Provider? PT is having a reaction to poison ivy   - she leaves tomorrow at 4 PM for a wedding - she is looking for a cream   or ointment to help with this  ---------------------------------------------------------------------------  --------------  CALL BACK INFO  What is the best way for the office to contact you? OK to leave message on   voicemail  Preferred Call Back Phone Number? [phone]  ---------------------------------------------------------------------------  --------------  SCRIPT ANSWERS  Relationship to Patient? Self  Are you having swelling in your throat or face? No  Are you having difficulty breathing? No  Have the symptoms worsened or spread in the last day? No  Are you having fevers (100.4), chills or sweats? No  Have you recently (14 days) seen a provider for this issue? No  Have you been diagnosed with, awaiting test results for, or told that you   are suspected of having COVID-19 (Coronavirus)? (If patient has tested   negative or was tested as a requirement for work, school, or travel and   not based on symptoms, answer no)? No  Within the past two weeks have you developed any of the following symptoms   (answer no if symptoms have been present longer than 2 weeks or began   more than 2 weeks ago)? Fever or Chills, Cough, Shortness of breath or   difficulty breathing, Loss of taste or smell, Sore throat, Nasal   congestion, Sneezing or runny nose, Fatigue or generalized body aches   (answer no if pain is specific to a body part e.g. back pain), Diarrhea,   Headache? No  Have you had close contact with someone with COVID-19 in the last 14 days?    No  (Service Expert  click yes below to proceed with [employer] As Usual   Scheduling)?  Yes

## 2021-10-23 PROCEDURE — 99284 EMERGENCY DEPT VISIT MOD MDM: CPT

## 2021-10-24 ENCOUNTER — HOSPITAL ENCOUNTER (EMERGENCY)
Age: 41
Discharge: HOME | End: 2021-10-24
Attending: EMERGENCY MEDICINE
Payer: COMMERCIAL

## 2021-10-24 ENCOUNTER — APPOINTMENT (OUTPATIENT)
Dept: CT IMAGING | Age: 41
End: 2021-10-24
Payer: COMMERCIAL

## 2021-10-24 VITALS
HEART RATE: 77 BPM | BODY MASS INDEX: 21.19 KG/M2 | OXYGEN SATURATION: 97 % | DIASTOLIC BLOOD PRESSURE: 57 MMHG | SYSTOLIC BLOOD PRESSURE: 106 MMHG | HEIGHT: 67 IN | WEIGHT: 135 LBS | RESPIRATION RATE: 18 BRPM | TEMPERATURE: 98.1 F

## 2021-10-24 DIAGNOSIS — K52.9 COLITIS: Primary | ICD-10-CM

## 2021-10-24 LAB
-: ABNORMAL
-: NORMAL
ABSOLUTE EOS #: 0.1 K/UL (ref 0–0.4)
ABSOLUTE IMMATURE GRANULOCYTE: ABNORMAL K/UL (ref 0–0.3)
ABSOLUTE LYMPH #: 1.4 K/UL (ref 1–4.8)
ABSOLUTE MONO #: 0.6 K/UL (ref 0.1–1.3)
ALBUMIN SERPL-MCNC: 4.4 G/DL (ref 3.5–5.2)
ALBUMIN/GLOBULIN RATIO: NORMAL (ref 1–2.5)
ALP BLD-CCNC: 54 U/L (ref 35–104)
ALT SERPL-CCNC: 12 U/L (ref 5–33)
AMORPHOUS: ABNORMAL
ANION GAP SERPL CALCULATED.3IONS-SCNC: 9 MMOL/L (ref 9–17)
AST SERPL-CCNC: 17 U/L
BACTERIA: ABNORMAL
BASOPHILS # BLD: 1 % (ref 0–2)
BASOPHILS ABSOLUTE: 0.1 K/UL (ref 0–0.2)
BILIRUB SERPL-MCNC: 0.37 MG/DL (ref 0.3–1.2)
BILIRUBIN DIRECT: 0.13 MG/DL
BILIRUBIN URINE: NEGATIVE
BILIRUBIN, INDIRECT: 0.24 MG/DL (ref 0–1)
BUN BLDV-MCNC: 13 MG/DL (ref 6–20)
BUN/CREAT BLD: ABNORMAL (ref 9–20)
CALCIUM SERPL-MCNC: 8.9 MG/DL (ref 8.6–10.4)
CASTS UA: ABNORMAL /LPF
CHLORIDE BLD-SCNC: 103 MMOL/L (ref 98–107)
CO2: 24 MMOL/L (ref 20–31)
COLOR: YELLOW
COMMENT UA: ABNORMAL
CREAT SERPL-MCNC: 0.82 MG/DL (ref 0.5–0.9)
CRYSTALS, UA: ABNORMAL /HPF
DATE, STOOL #1: NORMAL
DATE, STOOL #2: NORMAL
DATE, STOOL #3: NORMAL
DIFFERENTIAL TYPE: ABNORMAL
EOSINOPHILS RELATIVE PERCENT: 1 % (ref 0–4)
EPITHELIAL CELLS UA: ABNORMAL /HPF
GFR AFRICAN AMERICAN: >60 ML/MIN
GFR NON-AFRICAN AMERICAN: >60 ML/MIN
GFR SERPL CREATININE-BSD FRML MDRD: ABNORMAL ML/MIN/{1.73_M2}
GFR SERPL CREATININE-BSD FRML MDRD: ABNORMAL ML/MIN/{1.73_M2}
GLOBULIN: NORMAL G/DL (ref 1.5–3.8)
GLUCOSE BLD-MCNC: 160 MG/DL (ref 70–99)
GLUCOSE URINE: NEGATIVE
HCT VFR BLD CALC: 36.3 % (ref 36–46)
HEMOCCULT SP1 STL QL: NEGATIVE
HEMOCCULT SP2 STL QL: NORMAL
HEMOCCULT SP3 STL QL: NORMAL
HEMOGLOBIN: 12.2 G/DL (ref 12–16)
IMMATURE GRANULOCYTES: ABNORMAL %
KETONES, URINE: NEGATIVE
LEUKOCYTE ESTERASE, URINE: NEGATIVE
LIPASE: 25 U/L (ref 13–60)
LYMPHOCYTES # BLD: 16 % (ref 24–44)
MAGNESIUM: 1.9 MG/DL (ref 1.6–2.6)
MCH RBC QN AUTO: 29.1 PG (ref 26–34)
MCHC RBC AUTO-ENTMCNC: 33.7 G/DL (ref 31–37)
MCV RBC AUTO: 86.6 FL (ref 80–100)
MONOCYTES # BLD: 7 % (ref 1–7)
MUCUS: ABNORMAL
NITRITE, URINE: NEGATIVE
NRBC AUTOMATED: ABNORMAL PER 100 WBC
OTHER OBSERVATIONS UA: ABNORMAL
PDW BLD-RTO: 13.7 % (ref 11.5–14.9)
PH UA: 5.5 (ref 5–8)
PLATELET # BLD: 228 K/UL (ref 150–450)
PLATELET ESTIMATE: ABNORMAL
PMV BLD AUTO: 7.8 FL (ref 6–12)
POTASSIUM SERPL-SCNC: 3.7 MMOL/L (ref 3.7–5.3)
PROTEIN UA: NEGATIVE
RBC # BLD: 4.19 M/UL (ref 4–5.2)
RBC # BLD: ABNORMAL 10*6/UL
RBC UA: ABNORMAL /HPF
REASON FOR REJECTION: NORMAL
RENAL EPITHELIAL, UA: ABNORMAL /HPF
SEG NEUTROPHILS: 75 % (ref 36–66)
SEGMENTED NEUTROPHILS ABSOLUTE COUNT: 6.6 K/UL (ref 1.3–9.1)
SODIUM BLD-SCNC: 136 MMOL/L (ref 135–144)
SPECIFIC GRAVITY UA: 1.03 (ref 1–1.03)
TIME, STOOL #1: NORMAL
TIME, STOOL #2: NORMAL
TIME, STOOL #3: NORMAL
TOTAL PROTEIN: 6.9 G/DL (ref 6.4–8.3)
TRICHOMONAS: ABNORMAL
TURBIDITY: CLEAR
URINE HGB: ABNORMAL
UROBILINOGEN, URINE: NORMAL
WBC # BLD: 8.8 K/UL (ref 3.5–11)
WBC # BLD: ABNORMAL 10*3/UL
WBC UA: ABNORMAL /HPF
YEAST: ABNORMAL
ZZ NTE CLEAN UP: ORDERED TEST: NORMAL
ZZ NTE WITH NAME CLEAN UP: SPECIMEN SOURCE: NORMAL

## 2021-10-24 PROCEDURE — 74177 CT ABD & PELVIS W/CONTRAST: CPT

## 2021-10-24 PROCEDURE — 83735 ASSAY OF MAGNESIUM: CPT

## 2021-10-24 PROCEDURE — 81001 URINALYSIS AUTO W/SCOPE: CPT

## 2021-10-24 PROCEDURE — 96374 THER/PROPH/DIAG INJ IV PUSH: CPT

## 2021-10-24 PROCEDURE — 82270 OCCULT BLOOD FECES: CPT

## 2021-10-24 PROCEDURE — 6360000004 HC RX CONTRAST MEDICATION: Performed by: EMERGENCY MEDICINE

## 2021-10-24 PROCEDURE — 80076 HEPATIC FUNCTION PANEL: CPT

## 2021-10-24 PROCEDURE — 36415 COLL VENOUS BLD VENIPUNCTURE: CPT

## 2021-10-24 PROCEDURE — 6370000000 HC RX 637 (ALT 250 FOR IP): Performed by: EMERGENCY MEDICINE

## 2021-10-24 PROCEDURE — 80048 BASIC METABOLIC PNL TOTAL CA: CPT

## 2021-10-24 PROCEDURE — 2580000003 HC RX 258: Performed by: EMERGENCY MEDICINE

## 2021-10-24 PROCEDURE — 6360000002 HC RX W HCPCS: Performed by: EMERGENCY MEDICINE

## 2021-10-24 PROCEDURE — 83690 ASSAY OF LIPASE: CPT

## 2021-10-24 PROCEDURE — 85025 COMPLETE CBC W/AUTO DIFF WBC: CPT

## 2021-10-24 RX ORDER — DICYCLOMINE HYDROCHLORIDE 10 MG/1
10 CAPSULE ORAL ONCE
Status: COMPLETED | OUTPATIENT
Start: 2021-10-24 | End: 2021-10-24

## 2021-10-24 RX ORDER — 0.9 % SODIUM CHLORIDE 0.9 %
80 INTRAVENOUS SOLUTION INTRAVENOUS ONCE
Status: COMPLETED | OUTPATIENT
Start: 2021-10-24 | End: 2021-10-24

## 2021-10-24 RX ORDER — 0.9 % SODIUM CHLORIDE 0.9 %
1000 INTRAVENOUS SOLUTION INTRAVENOUS ONCE
Status: COMPLETED | OUTPATIENT
Start: 2021-10-24 | End: 2021-10-24

## 2021-10-24 RX ORDER — METRONIDAZOLE 500 MG/1
500 TABLET ORAL 2 TIMES DAILY
Qty: 14 TABLET | Refills: 0 | Status: SHIPPED | OUTPATIENT
Start: 2021-10-24 | End: 2021-10-31

## 2021-10-24 RX ORDER — METRONIDAZOLE 500 MG/1
500 TABLET ORAL ONCE
Status: COMPLETED | OUTPATIENT
Start: 2021-10-24 | End: 2021-10-24

## 2021-10-24 RX ORDER — CIPROFLOXACIN 500 MG/1
500 TABLET, FILM COATED ORAL ONCE
Status: COMPLETED | OUTPATIENT
Start: 2021-10-24 | End: 2021-10-24

## 2021-10-24 RX ORDER — SODIUM CHLORIDE 0.9 % (FLUSH) 0.9 %
10 SYRINGE (ML) INJECTION PRN
Status: DISCONTINUED | OUTPATIENT
Start: 2021-10-24 | End: 2021-10-24 | Stop reason: HOSPADM

## 2021-10-24 RX ORDER — CIPROFLOXACIN 500 MG/1
500 TABLET, FILM COATED ORAL 2 TIMES DAILY
Qty: 14 TABLET | Refills: 0 | Status: SHIPPED | OUTPATIENT
Start: 2021-10-24 | End: 2021-10-31

## 2021-10-24 RX ORDER — ONDANSETRON 2 MG/ML
4 INJECTION INTRAMUSCULAR; INTRAVENOUS ONCE
Status: COMPLETED | OUTPATIENT
Start: 2021-10-24 | End: 2021-10-24

## 2021-10-24 RX ADMIN — ONDANSETRON 4 MG: 2 INJECTION INTRAMUSCULAR; INTRAVENOUS at 00:52

## 2021-10-24 RX ADMIN — CIPROFLOXACIN 500 MG: 500 TABLET, FILM COATED ORAL at 03:20

## 2021-10-24 RX ADMIN — SODIUM CHLORIDE 80 ML: 9 INJECTION, SOLUTION INTRAVENOUS at 02:07

## 2021-10-24 RX ADMIN — SODIUM CHLORIDE 1000 ML: 9 INJECTION, SOLUTION INTRAVENOUS at 00:52

## 2021-10-24 RX ADMIN — DICYCLOMINE HYDROCHLORIDE 10 MG: 10 CAPSULE ORAL at 03:20

## 2021-10-24 RX ADMIN — SODIUM CHLORIDE, PRESERVATIVE FREE 10 ML: 5 INJECTION INTRAVENOUS at 02:07

## 2021-10-24 RX ADMIN — METRONIDAZOLE 500 MG: 500 TABLET ORAL at 03:19

## 2021-10-24 RX ADMIN — IOPAMIDOL 75 ML: 755 INJECTION, SOLUTION INTRAVENOUS at 02:06

## 2021-10-24 ASSESSMENT — ENCOUNTER SYMPTOMS
DIARRHEA: 1
SHORTNESS OF BREATH: 0
ABDOMINAL PAIN: 1
VOMITING: 0
COUGH: 0
BACK PAIN: 0

## 2021-10-24 ASSESSMENT — PAIN DESCRIPTION - DESCRIPTORS: DESCRIPTORS: ACHING;CRAMPING

## 2021-10-24 ASSESSMENT — PAIN DESCRIPTION - LOCATION: LOCATION: ABDOMEN

## 2021-10-24 ASSESSMENT — PAIN DESCRIPTION - PAIN TYPE: TYPE: ACUTE PAIN

## 2021-10-24 ASSESSMENT — PAIN SCALES - GENERAL: PAINLEVEL_OUTOF10: 7

## 2021-10-24 NOTE — ED TRIAGE NOTES
Pt presents to ED with c/o abdominal pain and lightheadedness that began at 5 pm pt has hx of IBS but pain does not feel like her usual episodes. Pt also reports noticing mucus and blood in stool. Pt ambulatory to room with normal gait, alert and oriented x 4 with no signs of distress at this time.

## 2021-10-24 NOTE — ED PROVIDER NOTES
EMERGENCY DEPARTMENT ENCOUNTER    Pt Name: Natasha Ray  MRN: [id number]  [place] 1980  Date of evaluation: 10/24/21  CHIEF COMPLAINT       Chief Complaint   Patient presents with    Abdominal Pain    Dizziness     HISTORY OF PRESENT ILLNESS   HPI     This is a 43-year-old female with a history of irritable bowel syndrome who comes in today with abdominal pain around 5 PM the patient developed severe abdominal pain followed by diarrhea this is normal for her but it is more severe and more prolonged than normal she was normally just having stools but now it is blood mixed with mucus which is not normal for her. 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 No one else at home is sick no recent antibiotic use. Has not had a colonoscopy since she was a teenager    REVIEW OF SYSTEMS     Review of Systems   Constitutional: Negative for fever. HENT: Negative for congestion. Respiratory: Negative for cough and shortness of breath. Cardiovascular: Negative for chest pain. Gastrointestinal: Positive for abdominal pain and diarrhea. Negative for vomiting. Genitourinary: Negative for dysuria. Musculoskeletal: Negative for back pain. Skin: Negative for rash. Neurological: Negative for headaches. All other systems reviewed and are negative. PASTMEDICAL HISTORY     Past Medical History:   Diagnosis Date    Allergic rhinitis     GERD (gastroesophageal reflux disease)     Granuloma, skin     Lip    Hypothyroid     IBS (irritable bowel syndrome)     Migraine     MVP (mitral valve prolapse)      SURGICAL HISTORY     History reviewed.  No pertinent surgical history. CURRENT MEDICATIONS       Previous Medications    LEVOTHYROXINE (SYNTHROID) 112 MCG TABLET        MULTIPLE VITAMINS-MINERALS (THERAPEUTIC MULTIVITAMIN-MINERALS) TABLET    Take 1 tablet by mouth daily    OMEGA-3 1000 MG CAPS    Take by mouth    PREDNISONE (DELTASONE) 10 MG TABLET    Take 3 tabs po daily for 4 days. PROBIOTIC PRODUCT (PROBIOTIC-10 PO)    Take 10 mg by mouth daily    TRIAMCINOLONE (KENALOG) 0.1 % CREAM    Apply topically 2 times daily. VALACYCLOVIR (VALTREX) 1 G TABLET    Take 2 tablets by mouth 2 times daily For 2 doses. ALLERGIES     has No Known Allergies. FAMILY HISTORY     She indicated that her mother is alive. She indicated that her father is alive. She indicated that her paternal grandmother is alive. She indicated that her paternal grandfather is alive. She indicated that her paternal aunt is alive. SOCIAL HISTORY       Social History     Tobacco Use    Smoking status: Never Smoker    Smokeless tobacco: Never Used   Substance Use Topics    Alcohol use: Yes     Comment: very  rare    Drug use: Never     PHYSICAL EXAM     INITIAL VITALS: BP (!) 106/57   Pulse 77   Temp 98.1 °F (36.7 °C) (Oral)   Resp 18   Ht 5' 7\" (1.702 m)   Wt 135 lb (61.2 kg)   LMP 09/21/2021   SpO2 97%   BMI 21.14 kg/m²    Physical Exam  Vitals and nursing note reviewed. Constitutional:       Appearance: She is well-developed. HENT:      Head: Normocephalic and atraumatic. Eyes:      Conjunctiva/sclera: Conjunctivae normal.   Cardiovascular:      Rate and Rhythm: Normal rate and regular rhythm. Heart sounds: No murmur heard. No friction rub. Pulmonary:      Effort: Pulmonary effort is normal. No respiratory distress. Breath sounds: Normal breath sounds. No stridor. No wheezing or rhonchi. Abdominal:      Palpations: Abdomen is soft.       Comments: Global mild abdominal tenderness to palpation without any guarding or rebound   Musculoskeletal:      Cervical back: Neck supple. Skin:     General: Skin is warm and dry. Capillary Refill: Capillary refill takes less than 2 seconds. Neurological:      Mental Status: She is alert. DIAGNOSTIC RESULTS   RADIOLOGY:All plain film, CT, MRI, and formal ultrasound images (except ED bedside ultrasound) are read by the radiologist, see reports below, unless otherwisenoted in MDM or here. CT ABDOMEN PELVIS W IV CONTRAST Additional Contrast? None   Final Result   1. Nonspecific colitis of the descending to sigmoid colon. 2. Intermediate attenuation lesion measuring 4.3 x 4.5 x 4.1 cm focus in the   left hepatic lobe. MRI with and without contrast recommended as hepatic   abscess is in the differential.   3. Other findings as described. LABS: All lab results were reviewed by myself, and all abnormals are listed below. Labs Reviewed   CBC WITH AUTO DIFFERENTIAL - Abnormal; Notable for the following components:       Result Value    Seg Neutrophils 75 (*)     Lymphocytes 16 (*)     All other components within normal limits   BASIC METABOLIC PANEL - Abnormal; Notable for the following components:    Glucose 160 (*)     All other components within normal limits   URINALYSIS - Abnormal; Notable for the following components:    Urine Hgb TRACE (*)     All other components within normal limits   MICROSCOPIC URINALYSIS - Abnormal; Notable for the following components:    Bacteria, UA MANY (*)     Mucus, UA 2+ (*)     All other components within normal limits   MAGNESIUM   LIPASE   HEPATIC FUNCTION PANEL   SPECIMEN REJECTION   OCCULT BLOOD SCREEN       EMERGENCY DEPARTMENTCOURSE:   Differential diagnosis includes appendicitis cholecystitis diverticulitis IBS infectious diarrhea    3:03 AM EDT  There is no electrolyte abnormality, no elevation in creatinine, no anion gap elevation. No elevation in LFTs or lipase.  No leukocytosis, no anemia urinalysis is negative for infection CT scan reveals nonspecific colitis of the descending to sigmoid colon patient denies any recent travel no recent antibiotics. Will treat with Cipro Flagyl she is afebrile not tachycardic not tachypneic no leukocytosis believe that this can be managed as an outpatient I did recommend that she follow-up with a gastroenterologist however because she may need a colonoscopy. Patient will be discharged. I did confirm the treatment of colitis with Cipro Flagyl with pharmacy         Vitals:    Vitals:    10/24/21 0130 10/24/21 0145 10/24/21 0215 10/24/21 0230   BP: 100/65 111/64 106/64 (!) 106/57   Pulse:       Resp:       Temp:       TempSrc:       SpO2: 100% 99% 97% 97%   Weight:       Height:           The patient was given the following medications while in the emergency department:  Orders Placed This Encounter   Medications    0.9 % sodium chloride bolus    ondansetron (ZOFRAN) injection 4 mg    0.9 % sodium chloride bolus    iopamidol (ISOVUE-370) 76 % injection 75 mL    sodium chloride flush 0.9 % injection 10 mL    ciprofloxacin (CIPRO) tablet 500 mg     Order Specific Question:   Antimicrobial Indications     Answer:   Intra-Abdominal Infection    metroNIDAZOLE (FLAGYL) tablet 500 mg     Order Specific Question:   Antimicrobial Indications     Answer:   Intra-Abdominal Infection    dicyclomine (BENTYL) capsule 10 mg    ciprofloxacin (CIPRO) 500 MG tablet     Sig: Take 1 tablet by mouth 2 times daily for 7 days     Dispense:  14 tablet     Refill:  0    metroNIDAZOLE (FLAGYL) 500 MG tablet     Sig: Take 1 tablet by mouth 2 times daily for 7 days     Dispense:  14 tablet     Refill:  0         FINAL IMPRESSION      1.  Colitis         DISPOSITION/PLAN   DISPOSITION Decision To Discharge 10/24/2021 03:05:38 AM      PATIENT REFERRED TO:  Joseph Flores MD  [address] [place] [id number]  [phone]    Schedule an appointment as soon as possible for a visit       DISCHARGE MEDICATIONS:  New Prescriptions    CIPROFLOXACIN (CIPRO) 500 MG TABLET    Take 1 tablet by mouth 2 times daily for 7 days    METRONIDAZOLE (FLAGYL) 500 MG TABLET    Take 1 tablet by mouth 2 times daily for 7 days     Demetris Leon MD  Attending Emergency Physician    This charting supersedes any ED resident or staff charting and was written using speech recognition software        Demetris Leon MD  10/24/21 [id number]

## 2021-10-27 ENCOUNTER — TELEPHONE (OUTPATIENT)
Dept: FAMILY MEDICINE CLINIC | Age: 41
End: 2021-10-27

## 2021-10-27 DIAGNOSIS — R19.7 DIARRHEA, UNSPECIFIED TYPE: Primary | ICD-10-CM

## 2021-10-27 DIAGNOSIS — R10.9 ABDOMINAL PAIN, UNSPECIFIED ABDOMINAL LOCATION: ICD-10-CM

## 2021-10-27 NOTE — TELEPHONE ENCOUNTER
Patient called stating she would like a referral to Dr. Mo Herman ([address] [place] [address]) GI. She was seen in ER on 10-24-21 with abdominal pain, diarrhea (colitis). Can I go ahead and place the referral for her? Please advise.

## 2021-11-08 ENCOUNTER — HOSPITAL ENCOUNTER (OUTPATIENT)
Age: 41
Discharge: HOME | End: 2021-11-08
Payer: COMMERCIAL

## 2021-11-08 LAB
HCT VFR BLD CALC: 37 % (ref 36–46)
HEMOGLOBIN: 12.2 G/DL (ref 12–16)
IRON SATURATION: 26 % (ref 20–55)
IRON: 71 UG/DL (ref 37–145)
MCH RBC QN AUTO: 29 PG (ref 26–34)
MCHC RBC AUTO-ENTMCNC: 33 G/DL (ref 31–37)
MCV RBC AUTO: 87.8 FL (ref 80–100)
NRBC AUTOMATED: NORMAL PER 100 WBC
PDW BLD-RTO: 13.3 % (ref 11.5–14.9)
PLATELET # BLD: 258 K/UL (ref 150–450)
PMV BLD AUTO: 7.9 FL (ref 6–12)
RBC # BLD: 4.22 M/UL (ref 4–5.2)
THYROXINE, FREE: 1.55 NG/DL (ref 0.93–1.7)
TOTAL IRON BINDING CAPACITY: 271 UG/DL (ref 250–450)
TSH SERPL DL<=0.05 MIU/L-ACNC: 0.69 MIU/L (ref 0.3–5)
UNSATURATED IRON BINDING CAPACITY: 200 UG/DL (ref 112–347)
VITAMIN D 25-HYDROXY: 32.9 NG/ML (ref 30–100)
WBC # BLD: 6.8 K/UL (ref 3.5–11)

## 2021-11-08 PROCEDURE — 83540 ASSAY OF IRON: CPT

## 2021-11-08 PROCEDURE — 84439 ASSAY OF FREE THYROXINE: CPT

## 2021-11-08 PROCEDURE — 82306 VITAMIN D 25 HYDROXY: CPT

## 2021-11-08 PROCEDURE — 83550 IRON BINDING TEST: CPT

## 2021-11-08 PROCEDURE — 84443 ASSAY THYROID STIM HORMONE: CPT

## 2021-11-08 PROCEDURE — 36415 COLL VENOUS BLD VENIPUNCTURE: CPT

## 2021-11-08 PROCEDURE — 85027 COMPLETE CBC AUTOMATED: CPT

## 2022-02-08 ENCOUNTER — TELEPHONE (OUTPATIENT)
Dept: FAMILY MEDICINE CLINIC | Age: 42
End: 2022-02-08

## 2022-02-08 DIAGNOSIS — R30.0 DYSURIA: Primary | ICD-10-CM

## 2022-02-08 RX ORDER — NITROFURANTOIN 25; 75 MG/1; MG/1
100 CAPSULE ORAL 2 TIMES DAILY
Qty: 14 CAPSULE | Refills: 0 | Status: SHIPPED | OUTPATIENT
Start: 2022-02-08 | End: 2022-02-15

## 2022-02-08 NOTE — TELEPHONE ENCOUNTER
The patient started noticing polyuria last week and she increased her fluid intake but polyuria and urgency started getting worse and she noticed hematuria today. She is currently in [place] at a conference until Wednesday and she wondered if something could be sent to the [facility] in [place]. Please advise.

## 2022-04-28 ENCOUNTER — OFFICE VISIT (OUTPATIENT)
Dept: FAMILY MEDICINE CLINIC | Age: 42
End: 2022-04-28
Payer: COMMERCIAL

## 2022-04-28 ENCOUNTER — HOSPITAL ENCOUNTER (OUTPATIENT)
Age: 42
Setting detail: SPECIMEN
Discharge: HOME | End: 2022-04-28

## 2022-04-28 VITALS
TEMPERATURE: 97.5 F | HEIGHT: 67 IN | WEIGHT: 131.8 LBS | BODY MASS INDEX: 20.69 KG/M2 | SYSTOLIC BLOOD PRESSURE: 106 MMHG | HEART RATE: 71 BPM | DIASTOLIC BLOOD PRESSURE: 72 MMHG | OXYGEN SATURATION: 98 %

## 2022-04-28 DIAGNOSIS — R30.0 DYSURIA: Primary | ICD-10-CM

## 2022-04-28 DIAGNOSIS — R31.9 HEMATURIA, UNSPECIFIED TYPE: ICD-10-CM

## 2022-04-28 DIAGNOSIS — R30.0 DYSURIA: ICD-10-CM

## 2022-04-28 LAB
APPEARANCE FLUID: ABNORMAL
BILIRUBIN, POC: ABNORMAL
BLOOD URINE, POC: ABNORMAL
CLARITY, POC: CLEAR
COLOR, POC: YELLOW
GLUCOSE URINE, POC: ABNORMAL
KETONES, POC: ABNORMAL
LEUKOCYTE EST, POC: ABNORMAL
NITRITE, POC: ABNORMAL
PH, POC: 6.5
PROTEIN, POC: ABNORMAL
SPECIFIC GRAVITY, POC: 1.01
UROBILINOGEN, POC: 0.2

## 2022-04-28 PROCEDURE — 99213 OFFICE O/P EST LOW 20 MIN: CPT | Performed by: NURSE PRACTITIONER

## 2022-04-28 PROCEDURE — 81002 URINALYSIS NONAUTO W/O SCOPE: CPT | Performed by: NURSE PRACTITIONER

## 2022-04-28 RX ORDER — L. ACIDOPHILUS/PECTIN, CITRUS 25MM-100MG
TABLET ORAL DAILY
COMMUNITY

## 2022-04-28 RX ORDER — IBUPROFEN 800 MG/1
TABLET ORAL
COMMUNITY
Start: 2022-04-14

## 2022-04-28 SDOH — ECONOMIC STABILITY: FOOD INSECURITY: WITHIN THE PAST 12 MONTHS, YOU WORRIED THAT YOUR FOOD WOULD RUN OUT BEFORE YOU GOT MONEY TO BUY MORE.: NEVER TRUE

## 2022-04-28 SDOH — ECONOMIC STABILITY: FOOD INSECURITY: WITHIN THE PAST 12 MONTHS, THE FOOD YOU BOUGHT JUST DIDN'T LAST AND YOU DIDN'T HAVE MONEY TO GET MORE.: NEVER TRUE

## 2022-04-28 ASSESSMENT — PATIENT HEALTH QUESTIONNAIRE - PHQ9
SUM OF ALL RESPONSES TO PHQ QUESTIONS 1-9: 0
1. LITTLE INTEREST OR PLEASURE IN DOING THINGS: 0
SUM OF ALL RESPONSES TO PHQ QUESTIONS 1-9: 0
2. FEELING DOWN, DEPRESSED OR HOPELESS: 0
SUM OF ALL RESPONSES TO PHQ QUESTIONS 1-9: 0
SUM OF ALL RESPONSES TO PHQ QUESTIONS 1-9: 0
SUM OF ALL RESPONSES TO PHQ9 QUESTIONS 1 & 2: 0

## 2022-04-28 ASSESSMENT — SOCIAL DETERMINANTS OF HEALTH (SDOH): HOW HARD IS IT FOR YOU TO PAY FOR THE VERY BASICS LIKE FOOD, HOUSING, MEDICAL CARE, AND HEATING?: NOT HARD AT ALL

## 2022-04-28 NOTE — PROGRESS NOTES
Subjective:      Patient ID: Steve Mendoza is a 36 y.o. female. Visit Information    Have you changed or started any medications since your last visit including any over-the-counter medicines, vitamins, or herbal medicines? no   Are you having any side effects from any of your medications? -  no  Have you stopped taking any of your medications? Is so, why? -  no    Have you seen any other physician or provider since your last visit? No  Have you had any other diagnostic tests since your last visit? Yes - Records Obtained  Have you been seen in the emergency room and/or had an admission to a hospital since we last saw you? No  Have you had your routine dental cleaning in the past 6 months? yes    Have you activated your [employer] account? If not, what are your barriers? Yes     Patient Care Team:  Trudy Yung MD as PCP - General (Family Medicine)  Flako Saenz MD as Consulting Physician (Endocrinology)  Natacha Cummings MD    Medical History Review  Past Medical, Family, and Social History reviewed and does contribute to the patient presenting condition    Health Maintenance   Topic Date Due    HIV screen  Never done    Hepatitis C screen  Never done    DTaP/Tdap/Td vaccine (6 - Tdap) 07/22/2007    Cervical cancer screen  03/20/2020    Depression Screen  01/29/2022    Flu vaccine (Season Ended) 09/01/2022    TSH  11/08/2022    Lipids  09/19/2023    Hepatitis B vaccine  Completed    COVID-19 Vaccine  Completed    Hepatitis A vaccine  Aged Out    Hib vaccine  Aged Out    Meningococcal (ACWY) vaccine  Aged Out    Pneumococcal 0-64 years Vaccine  Aged Out     HPI    SUBJECTIVE: Steve Mendoza is a 36 y.o. female who complains of urinary frequency, urgency and dysuria x 4 days, without flank pain, fever, chills, or abnormal vaginal discharge or bleeding. OBJECTIVE: Appears well, in no apparent distress.   Vital signs are normal. The abdomen is soft without tenderness, guarding, mass, rebound or organomegaly. No CVA tenderness or inguinal adenopathy noted. Urine dipstick shows moderate amount of blood . ASSESSMENT:     1. Dysuria- likely urethritis   - obtain Culture, Urine; Future  - POCT Urinalysis no Micro  - Urinalysis; Future  - will treat accordingly pending urine culture   - also push fluids, may use Pyridium OTC prn.    2. Hematuria, unspecified type  - Culture, Urine; Future  - will recheck UA if urine culture is negative    Call or return to clinic prn if these symptoms worsen or fail to improve as anticipated. Requested Prescriptions      No prescriptions requested or ordered in this encounter       There are no discontinued medications. All patient questions answered. Pt voiced understanding. No follow-ups on file.

## 2022-04-30 LAB
CULTURE: ABNORMAL
SPECIMEN DESCRIPTION: ABNORMAL

## 2022-05-02 RX ORDER — SULFAMETHOXAZOLE AND TRIMETHOPRIM 800; 160 MG/1; MG/1
1 TABLET ORAL 2 TIMES DAILY
Qty: 20 TABLET | Refills: 0 | Status: SHIPPED | OUTPATIENT
Start: 2022-05-02 | End: 2022-05-12

## 2022-07-21 ENCOUNTER — OFFICE VISIT (OUTPATIENT)
Dept: FAMILY MEDICINE CLINIC | Age: 42
End: 2022-07-21
Payer: COMMERCIAL

## 2022-07-21 ENCOUNTER — HOSPITAL ENCOUNTER (OUTPATIENT)
Age: 42
Setting detail: SPECIMEN
Discharge: HOME | End: 2022-07-21

## 2022-07-21 VITALS
TEMPERATURE: 98.8 F | BODY MASS INDEX: 20.39 KG/M2 | RESPIRATION RATE: 14 BRPM | WEIGHT: 130.2 LBS | SYSTOLIC BLOOD PRESSURE: 106 MMHG | DIASTOLIC BLOOD PRESSURE: 66 MMHG

## 2022-07-21 DIAGNOSIS — R30.0 DYSURIA: ICD-10-CM

## 2022-07-21 DIAGNOSIS — N30.01 ACUTE CYSTITIS WITH HEMATURIA: ICD-10-CM

## 2022-07-21 DIAGNOSIS — N30.01 ACUTE CYSTITIS WITH HEMATURIA: Primary | ICD-10-CM

## 2022-07-21 PROCEDURE — 81003 URINALYSIS AUTO W/O SCOPE: CPT | Performed by: NURSE PRACTITIONER

## 2022-07-21 PROCEDURE — 99213 OFFICE O/P EST LOW 20 MIN: CPT | Performed by: NURSE PRACTITIONER

## 2022-07-21 RX ORDER — NITROFURANTOIN 25; 75 MG/1; MG/1
100 CAPSULE ORAL 2 TIMES DAILY
Qty: 20 CAPSULE | Refills: 0 | Status: SHIPPED | OUTPATIENT
Start: 2022-07-21 | End: 2022-07-31

## 2022-07-21 NOTE — PROGRESS NOTES
Subjective:      Patient ID: Keith Melo is a 36 y.o. female. Visit Information    Have you changed or started any medications since your last visit including any over-the-counter medicines, vitamins, or herbal medicines? no   Are you having any side effects from any of your medications? -  no  Have you stopped taking any of your medications? Is so, why? -  no    Have you seen any other physician or provider since your last visit? No  Have you had any other diagnostic tests since your last visit? No  Have you been seen in the emergency room and/or had an admission to a hospital since we last saw you? No  Have you had your routine dental cleaning in the past 6 months? no    Have you activated your [employer] account? If not, what are your barriers? Yes     Patient Care Team:  Wes Quiroz MD as PCP - General (Family Medicine)  Gneesis Harrison MD as Consulting Physician (Endocrinology)  Andrey Ash MD    Medical History Review  Past Medical, Family, and Social History reviewed and does contribute to the patient presenting condition    Health Maintenance   Topic Date Due    HIV screen  Never done    Hepatitis C screen  Never done    DTaP/Tdap/Td vaccine (6 - Tdap) 07/22/2007    Cervical cancer screen  03/20/2020    Flu vaccine (1) 09/01/2022    Depression Screen  04/28/2023    Lipids  09/19/2023    Hepatitis B vaccine  Completed    COVID-19 Vaccine  Completed    Hepatitis A vaccine  Aged Out    Hib vaccine  Aged Out    Meningococcal (ACWY) vaccine  Aged Out    Pneumococcal 0-64 years Vaccine  Aged Out     SUBJECTIVE: Keith Melo is a 36 y.o. female who complains of recurrent urinary frequency, urgency dysuria and suprapubic  x 4 days, without flank pain, fever, chills, or abnormal vaginal discharge or bleeding. Had UTI April and symptoms came back . OBJECTIVE: Appears well, in no apparent distress.   Vital signs are normal. The abdomen is soft without tenderness, guarding, mass, rebound or organomegaly. No CVA tenderness or inguinal adenopathy noted. Urine dipstick shows moderate leukocytes with small amount of blood . ASSESSMENT:      1. UTI with hematuria  / dysuria   - obtain Culture, Urine; Future  - POCT Urinalysis no Micro  - start macrobid   - also push fluids, may use Pyridium OTC prn.  - Call or return to clinic prn if these symptoms worsen or fail to improve as anticipated. Requested Prescriptions        No prescriptions requested or ordered in this encounter         There are no discontinued medications. All patient questions answered. Pt voiced understanding. No follow-ups on file.

## 2022-07-22 LAB
CULTURE: NORMAL
SPECIMEN DESCRIPTION: NORMAL